# Patient Record
Sex: MALE | Race: WHITE | Employment: UNEMPLOYED | URBAN - METROPOLITAN AREA
[De-identification: names, ages, dates, MRNs, and addresses within clinical notes are randomized per-mention and may not be internally consistent; named-entity substitution may affect disease eponyms.]

---

## 2023-04-14 ENCOUNTER — APPOINTMENT (OUTPATIENT)
Dept: VASCULAR LAB | Age: 63
End: 2023-04-14
Payer: MEDICARE

## 2023-04-14 ENCOUNTER — HOSPITAL ENCOUNTER (EMERGENCY)
Age: 63
Discharge: LEFT BEFORE DISCHARGE | End: 2023-04-14
Attending: EMERGENCY MEDICINE

## 2023-04-14 ENCOUNTER — HOSPITAL ENCOUNTER (EMERGENCY)
Age: 63
Discharge: HOME | End: 2023-04-14
Payer: MEDICARE

## 2023-04-14 VITALS
HEIGHT: 63 IN | TEMPERATURE: 98 F | DIASTOLIC BLOOD PRESSURE: 73 MMHG | OXYGEN SATURATION: 97 % | HEART RATE: 58 BPM | SYSTOLIC BLOOD PRESSURE: 132 MMHG | WEIGHT: 128.6 LBS | RESPIRATION RATE: 16 BRPM | BODY MASS INDEX: 22.79 KG/M2

## 2023-04-14 VITALS
OXYGEN SATURATION: 95 % | DIASTOLIC BLOOD PRESSURE: 74 MMHG | RESPIRATION RATE: 20 BRPM | WEIGHT: 128.6 LBS | BODY MASS INDEX: 22.79 KG/M2 | HEIGHT: 63 IN | HEART RATE: 52 BPM | TEMPERATURE: 98.8 F | SYSTOLIC BLOOD PRESSURE: 128 MMHG

## 2023-04-14 DIAGNOSIS — R20.9 SENSATION OF COLD IN LOWER EXTREMITY: Primary | ICD-10-CM

## 2023-04-14 PROCEDURE — 93926 LOWER EXTREMITY STUDY: CPT

## 2023-04-14 PROCEDURE — 99284 EMERGENCY DEPT VISIT MOD MDM: CPT

## 2023-04-14 RX ORDER — LISINOPRIL 10 MG/1
10 TABLET ORAL DAILY
COMMUNITY

## 2023-04-14 RX ORDER — ATORVASTATIN CALCIUM 20 MG/1
20 TABLET, FILM COATED ORAL DAILY
COMMUNITY

## 2023-04-14 ASSESSMENT — PAIN - FUNCTIONAL ASSESSMENT
PAIN_FUNCTIONAL_ASSESSMENT: NONE - DENIES PAIN
PAIN_FUNCTIONAL_ASSESSMENT: NONE - DENIES PAIN

## 2023-04-14 NOTE — ED PROVIDER NOTES
[address] [facility]  ED  Emergency Department Encounter    Patient Name: Sinai Moran  MRN: [id number]  YOB: 1960  Date of Evaluation: 4/14/2023  Provider: Beba Horn MD  Note Started: 10:09 AM EDT 4/14/23    CHIEF COMPLAINT  Foot Problem (Pt reporting his right foot \"feels like an iceberg\" pt denies any pain and has good pedal pulses Patient signed in earlier today but left to go home and soak his foot in warm water. He was not seen by a provider )    SHARED SERVICE VISIT  Evaluated by [facility]. My supervising physician was available for consultation. HISTORY OF PRESENT ILLNESS  Sinai Moran is a 58 y.o. male who presents to the ED for 4-day history of foot coolness. Drove himself in for evaluation. Patient states that for the past 4 days his right foot has felt cold. Has been constant. Reports that despite interventions does not seem to be able to to go away. States that he did have a similar episode approximate 15 years ago requiring neck and back surgery. States that he has had no numbness or tingling or weakness. No redness or warmth. Pack-a-day cigarette smoker. Denies chest pain or shortness of breath. No complaints of dizziness confusion or disturbances. Difficulty speaking swallowing. Ambulatory without difficulty. .    No other complaints, modifying factors or associated symptoms. Nursing notes reviewed were all reviewed and agreed with or any disagreements were addressed in the HPI.     PMH:  Past Medical History:   Diagnosis Date    Arthritis     generalized    Fall     fell through a roof 23 yrs ago     Surgical History:  Past Surgical History:   Procedure Laterality Date    BACK SURGERY      Cervical & lumbar fusion    FINGER FRACTURE SURGERY      right    FRACTURE SURGERY      lt elbow, lt hip    OTHER SURGICAL HISTORY  6-17-13    ORIF left distal humerus     Family History:  Family History   Problem Relation Age of Onset    Cancer

## 2023-04-14 NOTE — ED NOTES
Pt returned from [address] [place] [address] Rd[address].      Osei Yost RN  04/14/23 5231

## 2023-04-14 NOTE — ED NOTES
Pt ok to d/c to home. Pt given d/c instructions. Pt verbalized understating including Rx and follow up care. [facility]  0 s/s of distress at time of d/c.         Carmella Najera RN  04/14/23 1400

## 2023-04-14 NOTE — ED NOTES
Pt oob walking in halls and room. Pt  voiced displeasure regarding having to wait for US. Pt educated that he is in line for testing and that [address] will come for pt when they available. Pt states I'm tired of waiting that's why I left here before. Pt educated that he needs to wait and if he continues to leave and come back he will continue to be placed back in line and will have to wait longer.        Paulo Holden RN  04/14/23 1037

## 2023-04-14 NOTE — ED NOTES
Pt continues to come to nurses station verbalizing how upset he is that he is waiting [place] [address]. Pt educated once again that he is being fit in the schedule after leaving AMA this morning. Pt speaking aggressively toward saff, this writer requested that pt continue to wait in room and that his aggressive speak will not be tolerated. This behavior also witnessed by Elda Dillon.       Shayy Bone RN  04/14/23 9297

## 2023-04-19 ENCOUNTER — TELEPHONE (OUTPATIENT)
Dept: VASCULAR SURGERY | Age: 63
End: 2023-04-19

## 2023-04-19 NOTE — TELEPHONE ENCOUNTER
Called patient and left him a message can work him in sooner if need to because of discomfort. carol

## 2023-04-28 ENCOUNTER — OFFICE VISIT (OUTPATIENT)
Dept: VASCULAR SURGERY | Age: 63
End: 2023-04-28
Payer: MEDICARE

## 2023-04-28 VITALS
DIASTOLIC BLOOD PRESSURE: 88 MMHG | WEIGHT: 129 LBS | BODY MASS INDEX: 22.86 KG/M2 | HEIGHT: 63 IN | SYSTOLIC BLOOD PRESSURE: 158 MMHG

## 2023-04-28 DIAGNOSIS — R20.9 COLD FEET: Primary | ICD-10-CM

## 2023-04-28 PROCEDURE — G8420 CALC BMI NORM PARAMETERS: HCPCS | Performed by: SURGERY

## 2023-04-28 PROCEDURE — G8427 DOCREV CUR MEDS BY ELIG CLIN: HCPCS | Performed by: SURGERY

## 2023-04-28 PROCEDURE — 99203 OFFICE O/P NEW LOW 30 MIN: CPT | Performed by: SURGERY

## 2023-04-28 NOTE — PROGRESS NOTES
Not on file   Occupational History    Not on file   Tobacco Use    Smoking status: Every Day     Packs/day: 1.00     Years: 30.00     Pack years: 30.00     Types: Cigarettes     Last attempt to quit: 2013     Years since quittin.9    Smokeless tobacco: Never    Tobacco comments:     quit smoking 2 weeks ago   Vaping Use    Vaping Use: Never used   Substance and Sexual Activity    Alcohol use: Yes     Comment: beer 3x/week    Drug use: Yes     Types: Marijuana Sunil Goldberg)     Comment: presciption    Sexual activity: Not on file   Other Topics Concern    Not on file   Social History Narrative    Not on file     Social Determinants of Health     Financial Resource Strain: Not on file   Food Insecurity: Not on file   Transportation Needs: Not on file   Physical Activity: Not on file   Stress: Not on file   Social Connections: Not on file   Intimate Partner Violence: Not on file   Housing Stability: Not on file       Family History:        Problem Relation Age of Onset    Cancer Mother         lung    Heart Disease Father     Kidney Disease Father        Review of Systems:  A 14 point review of systems was completed. Pertinent positives identified in the HPI, all other review of systems negative. Physical Examination:    BP (!) 158/88 (Site: Right Upper Arm)   Ht 5' 3\" (1.6 m)   Wt 129 lb (58.5 kg)   BMI 22.85 kg/m²     Weight: 129 lb (58.5 kg)       General appearance: NAD  Eyes: PERRLA  Neck: no JVD, no lymphadenopathy. Respiratory: effort is unlabored, no crackles, wheezes or rubs. Cardiovascular: regular, no murmur. No carotid bruits. No edema or varicosities. Pulses:    femoral DP   RIGHT       2       2   LEFT      2                 2   GI: abdomen soft, nondistended, no organomegaly. Musculoskeletal: strength and tone normal.  Extremities: warm and pink. Skin: no dermatitis or ulceration. Neuro/psychiatric: grossly intact.       MEDICAL DECISION MAKING/TESTING      Arterial Duplex:  Right

## 2023-11-13 ENCOUNTER — HOSPITAL ENCOUNTER (OUTPATIENT)
Dept: VASCULAR LAB | Age: 63
Discharge: HOME | End: 2023-11-13
Payer: MEDICARE

## 2023-11-13 DIAGNOSIS — I73.9 CLAUDICATION IN PERIPHERAL VASCULAR DISEASE (HCC): ICD-10-CM

## 2023-11-13 PROCEDURE — 93923 UPR/LXTR ART STDY 3+ LVLS: CPT

## 2023-11-13 PROCEDURE — 93922 UPR/L XTREMITY ART 2 LEVELS: CPT

## 2023-11-17 ENCOUNTER — HOSPITAL ENCOUNTER (OUTPATIENT)
Dept: CT IMAGING | Age: 63
Discharge: HOME | End: 2023-11-17
Attending: STUDENT IN AN ORGANIZED HEALTH CARE EDUCATION/TRAINING PROGRAM
Payer: MEDICARE

## 2023-11-17 DIAGNOSIS — M43.16 SPONDYLOLISTHESIS, LUMBAR REGION: ICD-10-CM

## 2023-11-17 DIAGNOSIS — M54.14 RADICULOPATHY, THORACIC REGION: ICD-10-CM

## 2023-11-17 DIAGNOSIS — M48.02 CERVICAL SPINAL STENOSIS: ICD-10-CM

## 2023-11-17 PROCEDURE — 72125 CT NECK SPINE W/O DYE: CPT

## 2023-11-17 PROCEDURE — 72131 CT LUMBAR SPINE W/O DYE: CPT

## 2023-11-17 PROCEDURE — 72128 CT CHEST SPINE W/O DYE: CPT
